# Patient Record
Sex: FEMALE | Race: OTHER | URBAN - METROPOLITAN AREA
[De-identification: names, ages, dates, MRNs, and addresses within clinical notes are randomized per-mention and may not be internally consistent; named-entity substitution may affect disease eponyms.]

---

## 2017-04-11 ENCOUNTER — TELEPHONE (OUTPATIENT)
Dept: FAMILY MEDICINE CLINIC | Facility: CLINIC | Age: 57
End: 2017-04-11

## 2017-04-11 DIAGNOSIS — Z12.31 SCREENING MAMMOGRAM, ENCOUNTER FOR: Primary | ICD-10-CM

## 2017-04-11 NOTE — TELEPHONE ENCOUNTER
Pt is scheduled for a physical on 5-16-17, but would like to know if doctor can give her an order for a mammogram. Pt is trying to coordinate both the appts for the same day, pt is off that day. Pt would like to  the order when ready.

## 2017-05-16 ENCOUNTER — OFFICE VISIT (OUTPATIENT)
Dept: FAMILY MEDICINE CLINIC | Facility: CLINIC | Age: 57
End: 2017-05-16

## 2017-05-16 ENCOUNTER — APPOINTMENT (OUTPATIENT)
Dept: LAB | Age: 57
End: 2017-05-16
Attending: FAMILY MEDICINE
Payer: COMMERCIAL

## 2017-05-16 VITALS
SYSTOLIC BLOOD PRESSURE: 118 MMHG | HEART RATE: 73 BPM | DIASTOLIC BLOOD PRESSURE: 75 MMHG | HEIGHT: 65 IN | WEIGHT: 182 LBS | TEMPERATURE: 99 F | BODY MASS INDEX: 30.32 KG/M2

## 2017-05-16 DIAGNOSIS — Z00.00 ROUTINE PHYSICAL EXAMINATION: ICD-10-CM

## 2017-05-16 DIAGNOSIS — E66.9 NON MORBID OBESITY, UNSPECIFIED OBESITY TYPE: ICD-10-CM

## 2017-05-16 DIAGNOSIS — N32.81 DETRUSOR INSTABILITY: ICD-10-CM

## 2017-05-16 DIAGNOSIS — T75.3XXA MOTION SICKNESS, INITIAL ENCOUNTER: ICD-10-CM

## 2017-05-16 DIAGNOSIS — Z00.00 ROUTINE PHYSICAL EXAMINATION: Primary | ICD-10-CM

## 2017-05-16 DIAGNOSIS — N95.1 MENOPAUSAL SYMPTOM: ICD-10-CM

## 2017-05-16 PROCEDURE — 36415 COLL VENOUS BLD VENIPUNCTURE: CPT

## 2017-05-16 PROCEDURE — 86803 HEPATITIS C AB TEST: CPT

## 2017-05-16 PROCEDURE — 80061 LIPID PANEL: CPT

## 2017-05-16 PROCEDURE — 80048 BASIC METABOLIC PNL TOTAL CA: CPT

## 2017-05-16 PROCEDURE — 84443 ASSAY THYROID STIM HORMONE: CPT

## 2017-05-16 PROCEDURE — 90471 IMMUNIZATION ADMIN: CPT | Performed by: FAMILY MEDICINE

## 2017-05-16 PROCEDURE — 90715 TDAP VACCINE 7 YRS/> IM: CPT | Performed by: FAMILY MEDICINE

## 2017-05-16 PROCEDURE — 99396 PREV VISIT EST AGE 40-64: CPT | Performed by: FAMILY MEDICINE

## 2017-05-16 RX ORDER — SCOLOPAMINE TRANSDERMAL SYSTEM 1 MG/1
1 PATCH, EXTENDED RELEASE TRANSDERMAL
Qty: 4 PATCH | Refills: 2 | Status: SHIPPED | OUTPATIENT
Start: 2017-05-16 | End: 2017-06-15

## 2017-05-16 RX ORDER — VENLAFAXINE HYDROCHLORIDE 37.5 MG/1
37.5 CAPSULE, EXTENDED RELEASE ORAL DAILY
Qty: 30 CAPSULE | Refills: 5 | Status: SHIPPED | OUTPATIENT
Start: 2017-05-16

## 2017-05-16 NOTE — PROGRESS NOTES
HPI:    Patient ID: Jolly Leone is a 55year old female. HPI    Review of Systems   Constitutional: Negative. Respiratory: Negative. Cardiovascular: Negative. Gastrointestinal: Negative. Genitourinary: Positive for frequency.    Skin: Nega lost significant weight since last visit and states at one point she was as low as 168 pounds. Some frustration with inconsistent management with [employer] fit program.    Menopausal symptoms–continued hot flashes.   Tried HRT 3 years ago but experienced lalita

## 2017-06-12 ENCOUNTER — TELEPHONE (OUTPATIENT)
Dept: FAMILY MEDICINE CLINIC | Facility: CLINIC | Age: 57
End: 2017-06-12

## 2017-11-21 ENCOUNTER — TELEPHONE (OUTPATIENT)
Dept: FAMILY MEDICINE CLINIC | Facility: CLINIC | Age: 57
End: 2017-11-21

## 2017-11-21 NOTE — TELEPHONE ENCOUNTER
Pt stated that she will not be able to make it to have CMP completed and would like for just the results that we have to be faxed to employer.

## 2017-11-21 NOTE — TELEPHONE ENCOUNTER
[facility] please see pending order. Left pt a vm informing pt that a CMP needs to be completed and a blank form is required due to the incorrect informing pt put on the form. Lab ordered.

## 2017-11-21 NOTE — TELEPHONE ENCOUNTER
Pt is returning call. Pt stts she only needs Dr. Sol Dao signature not an lab.  Pt stts she had labs completed in 2017  Pt asking for a call back,     CB # [phone]

## 2019-09-12 ENCOUNTER — TELEPHONE (OUTPATIENT)
Dept: FAMILY MEDICINE CLINIC | Facility: CLINIC | Age: 59
End: 2019-09-12

## 2019-09-12 NOTE — TELEPHONE ENCOUNTER
Spoke with patient, has concern regarding his son, will close this encounter and will open son's chart.

## 2020-02-21 ENCOUNTER — TELEPHONE (OUTPATIENT)
Dept: GASTROENTEROLOGY | Facility: CLINIC | Age: 60
End: 2020-02-21

## 2020-11-10 ENCOUNTER — TELEPHONE (OUTPATIENT)
Dept: FAMILY MEDICINE CLINIC | Facility: CLINIC | Age: 60
End: 2020-11-10

## 2020-11-10 NOTE — TELEPHONE ENCOUNTER
Please let her know we do not prescribe antibiotics for first-aid kit. It is not standard of care.   Furthermore, looks like she is due for routine physical.

## 2020-11-10 NOTE — TELEPHONE ENCOUNTER
Spoke with pt,  verified  Pt informed of MD recommendation, pt stated understanding. Pt stated she lives in [place] now for now, pt was advised to go to [facility] for eval and treat. Pt stated understanding.    She  Will also try to find a new PCP in the a

## 2020-11-10 NOTE — TELEPHONE ENCOUNTER
Patient states she ran out of her antibiotics and she likes to have them in hand when needed. Patient has a first aid kit were she fills it every time she is out. Patient requesting Dr. Josee Oneill to call her, patient is currently in [facility].     Phar

## (undated) NOTE — LETTER
2/21/2020    Marlaine Olszewski        [facility] [id number]            Dear Marlaine Olszewski,      Our records indicate that you are due for an appointment for a Colonoscopy in March 2020, or shortly there after, with Guillermina Sierra

## (undated) NOTE — MR AVS SNAPSHOT
[facility]  [address], [address] [facility] [address]  [phone]               Thank you for choosing us for your health care visit with Estevan Andres.  Charito Cason MD.  We are glad to serve you and happy to provide you with this summary [place] [employer]                Today's Vital Signs     BP Pulse Temp Height Weight BMI    118/75 mmHg 73 98.5 °F (36.9 °C) (Oral) 5' 5\" (1.651 m) 182 lb (82.555 kg) 30.29 kg/m2         Current Medications          This list is accurate as of: 5/16/17 11:46 Make half your plate fruits and vegetables Highly refined, white starches including white bread, rice and pasta   Eat plenty of protein, keep the fat content low Sugars:  sodas and sports drinks, candies and desserts   Eat plenty of low-fat dairy products